# Patient Record
Sex: FEMALE | Race: WHITE | Employment: FULL TIME | URBAN - METROPOLITAN AREA
[De-identification: names, ages, dates, MRNs, and addresses within clinical notes are randomized per-mention and may not be internally consistent; named-entity substitution may affect disease eponyms.]

---

## 2021-10-09 ENCOUNTER — HOSPITAL ENCOUNTER (INPATIENT)
Age: 21
LOS: 3 days | Discharge: HOME | DRG: 885 | End: 2021-10-14
Attending: EMERGENCY MEDICINE | Admitting: PSYCHIATRY & NEUROLOGY
Payer: COMMERCIAL

## 2021-10-09 DIAGNOSIS — F39 MOOD DISORDER (HCC): Primary | ICD-10-CM

## 2021-10-09 PROCEDURE — 80143 DRUG ASSAY ACETAMINOPHEN: CPT

## 2021-10-09 PROCEDURE — 83735 ASSAY OF MAGNESIUM: CPT

## 2021-10-09 PROCEDURE — 80179 DRUG ASSAY SALICYLATE: CPT

## 2021-10-09 PROCEDURE — 85025 COMPLETE CBC W/AUTO DIFF WBC: CPT

## 2021-10-09 PROCEDURE — 80076 HEPATIC FUNCTION PANEL: CPT

## 2021-10-09 PROCEDURE — 87636 SARSCOV2 & INF A&B AMP PRB: CPT

## 2021-10-09 PROCEDURE — 80307 DRUG TEST PRSMV CHEM ANLYZR: CPT

## 2021-10-09 PROCEDURE — 80048 BASIC METABOLIC PNL TOTAL CA: CPT

## 2021-10-09 PROCEDURE — 99285 EMERGENCY DEPT VISIT HI MDM: CPT

## 2021-10-09 PROCEDURE — 82077 ASSAY SPEC XCP UR&BREATH IA: CPT

## 2021-10-09 PROCEDURE — 93005 ELECTROCARDIOGRAM TRACING: CPT | Performed by: STUDENT IN AN ORGANIZED HEALTH CARE EDUCATION/TRAINING PROGRAM

## 2021-10-09 PROCEDURE — 36415 COLL VENOUS BLD VENIPUNCTURE: CPT

## 2021-10-09 RX ORDER — ACETAMINOPHEN 325 MG/1
650 TABLET ORAL ONCE
Status: COMPLETED | OUTPATIENT
Start: 2021-10-10 | End: 2021-10-10

## 2021-10-09 ASSESSMENT — ENCOUNTER SYMPTOMS
EYE PAIN: 0
COUGH: 0
SORE THROAT: 0
NAUSEA: 0
EYE DISCHARGE: 0
ABDOMINAL PAIN: 0
BACK PAIN: 0
DIARRHEA: 0
EYE REDNESS: 0
SINUS PRESSURE: 0
VOMITING: 0
WHEEZING: 0

## 2021-10-10 LAB
ACETAMINOPHEN LEVEL: <5 MCG/ML (ref 10–30)
ALBUMIN SERPL-MCNC: 4.2 G/DL (ref 3.5–5.2)
ALP BLD-CCNC: 72 U/L (ref 35–104)
ALT SERPL-CCNC: 15 U/L (ref 0–32)
AMPHETAMINE SCREEN, URINE: NOT DETECTED
ANION GAP SERPL CALCULATED.3IONS-SCNC: 15 MMOL/L (ref 7–16)
AST SERPL-CCNC: 20 U/L (ref 0–31)
BARBITURATE SCREEN URINE: NOT DETECTED
BASOPHILS ABSOLUTE: 0.02 E9/L (ref 0–0.2)
BASOPHILS RELATIVE PERCENT: 0.2 % (ref 0–2)
BENZODIAZEPINE SCREEN, URINE: NOT DETECTED
BILIRUB SERPL-MCNC: <0.2 MG/DL (ref 0–1.2)
BILIRUBIN DIRECT: <0.2 MG/DL (ref 0–0.3)
BILIRUBIN, INDIRECT: NORMAL MG/DL (ref 0–1)
BUN BLDV-MCNC: 8 MG/DL (ref 6–20)
CALCIUM SERPL-MCNC: 9.8 MG/DL (ref 8.6–10.2)
CANNABINOID SCREEN URINE: NOT DETECTED
CHLORIDE BLD-SCNC: 102 MMOL/L (ref 98–107)
CO2: 23 MMOL/L (ref 22–29)
COCAINE METABOLITE SCREEN URINE: NOT DETECTED
CREAT SERPL-MCNC: 0.6 MG/DL (ref 0.5–1)
EOSINOPHILS ABSOLUTE: 0.02 E9/L (ref 0.05–0.5)
EOSINOPHILS RELATIVE PERCENT: 0.2 % (ref 0–6)
ETHANOL: 35 MG/DL (ref 0–0.08)
FENTANYL SCREEN, URINE: NOT DETECTED
GFR AFRICAN AMERICAN: >60
GFR NON-AFRICAN AMERICAN: >60 ML/MIN/1.73
GLUCOSE BLD-MCNC: 89 MG/DL (ref 74–99)
HCG, URINE, POC: NEGATIVE
HCT VFR BLD CALC: 38.5 % (ref 34–48)
HEMOGLOBIN: 13 G/DL (ref 11.5–15.5)
IMMATURE GRANULOCYTES #: 0.03 E9/L
IMMATURE GRANULOCYTES %: 0.4 % (ref 0–5)
INFLUENZA A: NOT DETECTED
INFLUENZA B: NOT DETECTED
LYMPHOCYTES ABSOLUTE: 2.29 E9/L (ref 1.5–4)
LYMPHOCYTES RELATIVE PERCENT: 26.9 % (ref 20–42)
Lab: NORMAL
Lab: NORMAL
MAGNESIUM: 2.2 MG/DL (ref 1.6–2.6)
MCH RBC QN AUTO: 30.7 PG (ref 26–35)
MCHC RBC AUTO-ENTMCNC: 33.8 % (ref 32–34.5)
MCV RBC AUTO: 91 FL (ref 80–99.9)
METHADONE SCREEN, URINE: NOT DETECTED
MONOCYTES ABSOLUTE: 0.36 E9/L (ref 0.1–0.95)
MONOCYTES RELATIVE PERCENT: 4.2 % (ref 2–12)
NEGATIVE QC PASS/FAIL: NORMAL
NEUTROPHILS ABSOLUTE: 5.78 E9/L (ref 1.8–7.3)
NEUTROPHILS RELATIVE PERCENT: 68.1 % (ref 43–80)
OPIATE SCREEN URINE: NOT DETECTED
OXYCODONE URINE: NOT DETECTED
PDW BLD-RTO: 12 FL (ref 11.5–15)
PHENCYCLIDINE SCREEN URINE: NOT DETECTED
PLATELET # BLD: 300 E9/L (ref 130–450)
PMV BLD AUTO: 11.2 FL (ref 7–12)
POSITIVE QC PASS/FAIL: NORMAL
POTASSIUM REFLEX MAGNESIUM: 3.6 MMOL/L (ref 3.5–5)
RBC # BLD: 4.23 E12/L (ref 3.5–5.5)
SALICYLATE, SERUM: <0.3 MG/DL (ref 0–30)
SARS-COV-2 RNA, RT PCR: NOT DETECTED
SODIUM BLD-SCNC: 140 MMOL/L (ref 132–146)
TOTAL PROTEIN: 7.5 G/DL (ref 6.4–8.3)
TRICYCLIC ANTIDEPRESSANTS SCREEN SERUM: NEGATIVE NG/ML
WBC # BLD: 8.5 E9/L (ref 4.5–11.5)

## 2021-10-10 PROCEDURE — 80307 DRUG TEST PRSMV CHEM ANLYZR: CPT

## 2021-10-10 PROCEDURE — 93005 ELECTROCARDIOGRAM TRACING: CPT | Performed by: STUDENT IN AN ORGANIZED HEALTH CARE EDUCATION/TRAINING PROGRAM

## 2021-10-10 PROCEDURE — 6370000000 HC RX 637 (ALT 250 FOR IP): Performed by: STUDENT IN AN ORGANIZED HEALTH CARE EDUCATION/TRAINING PROGRAM

## 2021-10-10 PROCEDURE — 6370000000 HC RX 637 (ALT 250 FOR IP): Performed by: EMERGENCY MEDICINE

## 2021-10-10 RX ORDER — LEVOTHYROXINE SODIUM 0.05 MG/1
50 TABLET ORAL DAILY
COMMUNITY
Start: 2021-06-01

## 2021-10-10 RX ADMIN — METFORMIN HYDROCHLORIDE 500 MG: 500 TABLET ORAL at 21:14

## 2021-10-10 RX ADMIN — ACETAMINOPHEN 650 MG: 325 TABLET ORAL at 01:19

## 2021-10-10 ASSESSMENT — PAIN DESCRIPTION - PROGRESSION: CLINICAL_PROGRESSION: NOT CHANGED

## 2021-10-10 ASSESSMENT — PAIN SCALES - GENERAL: PAINLEVEL_OUTOF10: 7

## 2021-10-10 ASSESSMENT — PAIN DESCRIPTION - DESCRIPTORS: DESCRIPTORS: HEADACHE

## 2021-10-10 ASSESSMENT — PAIN DESCRIPTION - FREQUENCY: FREQUENCY: CONTINUOUS

## 2021-10-10 ASSESSMENT — PAIN DESCRIPTION - ONSET: ONSET: ON-GOING

## 2021-10-10 ASSESSMENT — PAIN DESCRIPTION - PAIN TYPE: TYPE: ACUTE PAIN

## 2021-10-10 ASSESSMENT — PAIN DESCRIPTION - LOCATION: LOCATION: HEAD
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
## 2021-10-10 NOTE — ED NOTES
Emergency Department [facility] AN AFFILIATE OF [facility] Emergency Room Biopsychosocial Assessment Note    Chief Complaint: The pt is a 22 yr old white female who presented to the ED after OD on 10 Zoloft (unknown exact amount) - she stated that she was drunk but her BAL was 35. MSE: The pt presented calm and cooperative with a flat affect and depressed mood. She is oriented times 4 and is a good historian. She denied a hx of AVH. Clinical Summary/History:  The pt stated that she has been having multiple stressors in that she is struggling with school and often feels that she is letting her family down. She stated that she goes to [employer] and works at [employer] and lives on campus in an apartment with 2 friends and her sister. She stated that she does not like letting people down and she feels like when she skips class or blows off her homework then she is letting her sister down. She stated that she has been self harming since she was 9 and did have an OD attempt on Oxy when she was 16. She stated that from age [age] she was using etoh and drugs and then her mom got her into counseling and she no longer has issues with that. She stated that its been a while since she has been to counseling but she does have an upcoming appointment with a counselor in [place]- where she is from. She stated that she gets her zoloft from her PCP. The pt denied a hx of HI, AHV, and inpt psych admits. The pt seemed to have difficulty pin pointing what her exact issues are and what caused her to decide to OD. Pt is pink slipped and will be referred for inpt psych admission. Gender  [] Male [x] Female [] Transgender  [] Other    Sexual Orientation    [x] Heterosexual [] Homosexual [] Bisexual [] Other    Suicidal Behavioral: CSSR-S Complete.   [x] Reports: took 10 zoloft/ Hx of attempts   [x] Past [x] Present   [] Denies    Homicidal/ Violent Behavior  [] Reports:   [] Past [] Present   [x] Denies     Hallucinations/Delusions   [] Reports:   [x] Denies     Substance Use/Alcohol Use/Addiction: SBIRT Screen Complete.    [x] Reports: Social alcohol   [] Denies     Trauma History  [x] Reports:  [x] Denies     Collateral Information: none      Level of Care/Disposition Plan  [] Home:   [] Outpatient Provider:   [] Crisis Unit:   [x] Inpatient Psychiatric Unit:  [] Other:        A[facility]an [facility]  10/10/21 8995

## 2021-10-10 NOTE — ED NOTES
Called and notified lab of add-on magnesium order, spoke to Lauren Lima.       Kike Leon RN  10/10/21 3405

## 2021-10-10 NOTE — ED NOTES
Received report from [facility] who states EKG and labs already completed and patient is on monitor.       Casey Quan RN  10/09/21 5872

## 2021-10-10 NOTE — ED NOTES
Patient remains A&OX4, respirations non-labored, skin warm/dry, no distress noted. Patient voicing no complaints at present, states she had a little bit of nausea earlier but it is gone now. Patient drinking water, tolerating well. Patient states she feels fine and wants to go, asked to sign herself out. Informed patient of pink slip and need to wait in ED until evaluated by social work.       Annie Serrano RN  10/10/21 3353

## 2021-10-10 NOTE — ED NOTES
Okay to now discontinue hourly EKG's per Dr. Leann Reynolds and Dr. Liliana Miller.       Annie Serrano RN  10/10/21 6498

## 2021-10-10 NOTE — ED PROVIDER NOTES
Patient is a 76-year-old female presenting to emergency department after drug overdose. Took over 10 tablets of 50 mg Zoloft, says she drank a lot of alcohol denies using anything else. Says she took it in an attempt to harm herself, has had previous suicide attempt with pills in the past.  Denies any homicidal ideation, any auditory or visual hallucinations. Onset of symptoms were 40 minutes prior to arrival, has been persistent, nothing makes it better, nothing makes them worse, constant, severe in severity. She complains of everything feeling tingly to me. And feeling anxious. Review of Systems   Constitutional: Negative for chills and fever. HENT: Negative for ear pain, sinus pressure and sore throat. Eyes: Negative for pain, discharge and redness. Respiratory: Negative for cough and wheezing. Cardiovascular: Negative for chest pain. Gastrointestinal: Negative for abdominal pain, diarrhea, nausea and vomiting. Genitourinary: Negative for dysuria and frequency. Musculoskeletal: Negative for arthralgias and back pain. Skin: Negative for rash and wound. Neurological: Positive for headaches. Negative for weakness. Hematological: Negative for adenopathy. Psychiatric/Behavioral: Positive for suicidal ideas. All other systems reviewed and are negative. Physical Exam  Vitals and nursing note reviewed. Constitutional:       Appearance: Normal appearance. She is not ill-appearing or toxic-appearing. HENT:      Head: Normocephalic and atraumatic. Right Ear: External ear normal.      Left Ear: External ear normal.      Nose: Nose normal.      Mouth/Throat:      Mouth: Mucous membranes are moist.   Eyes:      Extraocular Movements: Extraocular movements intact. Conjunctiva/sclera: Conjunctivae normal.      Pupils: Pupils are equal, round, and reactive to light.       Comments: Pupils dilated, but equal and reactive   Cardiovascular:      Rate and Rhythm: Regular rhythm. Tachycardia present. Pulses: Normal pulses. Heart sounds: Normal heart sounds. Pulmonary:      Effort: Pulmonary effort is normal. No respiratory distress. Breath sounds: Normal breath sounds. No stridor. No wheezing or rhonchi. Abdominal:      General: Abdomen is flat. Bowel sounds are normal.      Palpations: Abdomen is soft. Musculoskeletal:         General: Normal range of motion. Cervical back: Normal range of motion and neck supple. Skin:     General: Skin is warm and dry. Neurological:      General: No focal deficit present. Mental Status: She is alert and oriented to person, place, and time. Cranial Nerves: No cranial nerve deficit. Sensory: No sensory deficit. Motor: No weakness. Procedures     MDM     ED Course as of Oct 10 0532   Radha Osegueraippo Oct 10, 2021   0006 EKG: This EKG is signed by emergency department physician. Rate: 122  Rhythm: Sinus  Interpretation: incomplete RBBB  Comparison: no previous EKG available qtc 604        [JG]   0007 Poison control- 4.5-8hr peak, watch for qt prolong on serial EKGs, seizures, serotonin syndrome, seizures    [JG]   0037 EKG: This EKG is signed by emergency department physician. Rate: 101  Rhythm: Sinus  Interpretation: Left atrial management, incomplete right bundle  Comparison: changes compared to previous EKG , decreased compared to previous        [JG]   0108 Laboratory work shows an ethanol level of 35, but is otherwise within normal limits, QTC normalized, will repeat an EKG, continue to monitor in order to medically clear patient. [JG]   7585 Third EKG QTc normal, patient medically cleared    [JG]    Patient medically cleared, awaiting behavioral health disposition.     [JG]      ED Course User Index  [JG] Natalie Castillo MD   Patient presenting emergency department for drug overdose, took 10 tablets of 50 mg Zoloft, initially had a QTC of 604, was serially rechecked, was normalized x2, other laboratory work within normal limits, patient was medically cleared. ED Course as of Oct 10 0532   Hilary Herr Oct 10, 2021   0006 EKG: This EKG is signed by emergency department physician. Rate: 122  Rhythm: Sinus  Interpretation: incomplete RBBB  Comparison: no previous EKG available qtc 604        [JG]   0007 Poison control- 4.5-8hr peak, watch for qt prolong on serial EKGs, seizures, serotonin syndrome, seizures    [JG]   0037 EKG: This EKG is signed by emergency department physician. Rate: 101  Rhythm: Sinus  Interpretation: Left atrial management, incomplete right bundle  Comparison: changes compared to previous EKG , decreased compared to previous        [JG]   0108 Laboratory work shows an ethanol level of 35, but is otherwise within normal limits, QTC normalized, will repeat an EKG, continue to monitor in order to medically clear patient. [JG]   6110 Third EKG QTc normal, patient medically cleared    [JG]    Patient medically cleared, awaiting behavioral health disposition. [JG]      ED Course User Index  [JG] Jose Garay MD       --------------------------------------------- PAST HISTORY ---------------------------------------------  Past Medical History:  has no past medical history on file. Past Surgical History:  has no past surgical history on file. Social History:  reports that she has been smoking e-cigarettes. She has never used smokeless tobacco.    Family History: family history is not on file. The patients home medications have been reviewed. Allergies: Patient has no known allergies.     -------------------------------------------------- RESULTS -------------------------------------------------    LABS:  Results for orders placed or performed during the hospital encounter of 10/09/21   COVID-19 & Influenza Combo    Specimen: Nasopharyngeal Swab   Result Value Ref Range    SARS-CoV-2 RNA, RT PCR NOT DETECTED NOT DETECTED    INFLUENZA A NOT DETECTED NOT DETECTED    INFLUENZA B NOT DETECTED NOT DETECTED   CBC Auto Differential   Result Value Ref Range    WBC 8.5 4.5 - 11.5 E9/L    RBC 4.23 3.50 - 5.50 E12/L    Hemoglobin 13.0 11.5 - 15.5 g/dL    Hematocrit 38.5 34.0 - 48.0 %    MCV 91.0 80.0 - 99.9 fL    MCH 30.7 26.0 - 35.0 pg    MCHC 33.8 32.0 - 34.5 %    RDW 12.0 11.5 - 15.0 fL    Platelets 700 439 - 928 E9/L    MPV 11.2 7.0 - 12.0 fL    Neutrophils % 68.1 43.0 - 80.0 %    Immature Granulocytes % 0.4 0.0 - 5.0 %    Lymphocytes % 26.9 20.0 - 42.0 %    Monocytes % 4.2 2.0 - 12.0 %    Eosinophils % 0.2 0.0 - 6.0 %    Basophils % 0.2 0.0 - 2.0 %    Neutrophils Absolute 5.78 1.80 - 7.30 E9/L    Immature Granulocytes # 0.03 E9/L    Lymphocytes Absolute 2.29 1.50 - 4.00 E9/L    Monocytes Absolute 0.36 0.10 - 0.95 E9/L    Eosinophils Absolute 0.02 (L) 0.05 - 0.50 E9/L    Basophils Absolute 0.02 0.00 - 0.20 C4/A   Basic Metabolic Panel w/ Reflex to MG   Result Value Ref Range    Sodium 140 132 - 146 mmol/L    Potassium reflex Magnesium 3.6 3.5 - 5.0 mmol/L    Chloride 102 98 - 107 mmol/L    CO2 23 22 - 29 mmol/L    Anion Gap 15 7 - 16 mmol/L    Glucose 89 74 - 99 mg/dL    BUN 8 6 - 20 mg/dL    CREATININE 0.6 0.5 - 1.0 mg/dL    GFR Non-African American >60 >=60 mL/min/1.73    GFR African American >60     Calcium 9.8 8.6 - 10.2 mg/dL   Hepatic Function Panel   Result Value Ref Range    Total Protein 7.5 6.4 - 8.3 g/dL    Albumin 4.2 3.5 - 5.2 g/dL    Alkaline Phosphatase 72 35 - 104 U/L    ALT 15 0 - 32 U/L    AST 20 0 - 31 U/L    Total Bilirubin <0.2 0.0 - 1.2 mg/dL    Bilirubin, Direct <0.2 0.0 - 0.3 mg/dL    Bilirubin, Indirect see below 0.0 - 1.0 mg/dL   URINE DRUG SCREEN   Result Value Ref Range    Amphetamine Screen, Urine NOT DETECTED Negative <1000 ng/mL    Barbiturate Screen, Ur NOT DETECTED Negative < 200 ng/mL    Benzodiazepine Screen, Urine NOT DETECTED Negative < 200 ng/mL    Cannabinoid Scrn, Ur NOT DETECTED Negative < 50ng/mL    Cocaine Metabolite Screen, Urine NOT DETECTED Negative < 300 ng/mL    Opiate Scrn, Ur NOT DETECTED Negative < 300ng/mL    PCP Screen, Urine NOT DETECTED Negative < 25 ng/mL    Methadone Screen, Urine NOT DETECTED Negative <300 ng/mL    Oxycodone Urine NOT DETECTED Negative <100 ng/mL    FENTANYL SCREEN, URINE NOT DETECTED Negative <1 ng/mL    Drug Screen Comment: see below    Serum Drug Screen   Result Value Ref Range    Ethanol Lvl 35 mg/dL    Acetaminophen Level <5.0 (L) 10.0 - 76.0 mcg/mL    Salicylate, Serum <4.6 0.0 - 30.0 mg/dL    TCA Scrn NEGATIVE Cutoff:300 ng/mL   Magnesium   Result Value Ref Range    Magnesium 2.2 1.6 - 2.6 mg/dL   POC Pregnancy Urine   Result Value Ref Range    HCG, Urine, POC Negative Negative    Lot Number [id number]     Positive QC Pass/Fail Pass     Negative QC Pass/Fail Pass        RADIOLOGY:  No orders to display         ------------------------- NURSING NOTES AND VITALS REVIEWED ---------------------------  Date / Time Roomed:  10/9/2021 11:23 PM  ED Bed Assignment:  COBY MAGAÑA/KARENA    The nursing notes within the ED encounter and vital signs as below have been reviewed. Patient Vitals for the past 24 hrs:   Temp Temp src Pulse Resp SpO2 Height Weight   10/09/21 2318 98 °F (36.7 °C) Oral  16      10/09/21 2316      5' 6\" (1.676 m) 250 lb (113.4 kg)   10/09/21 2303   132  98 %         Oxygen Saturation Interpretation: Normal    ------------------------------------------ PROGRESS NOTES ------------------------------------------        --------------------------------- ADDITIONAL PROVIDER NOTES ---------------------------------    This patient has remained hemodynamically stable during their ED course. Diagnosis:  1. Mood disorder ([address]        Disposition:  Patient's disposition: Pending social work disposition  Patient's condition is stable.              Vicente Foley MD  Resident  10/10/21 7874

## 2021-10-10 NOTE — ED NOTES
Patient in hospital gown without ties, hospital pants, and hospital socks. All patient belongings in one labeled belongings bags and bag placed in [facility] AN AFFILIATE OF [facility] locker #[id number] per protocol.       Veda Nunn RN  10/10/21 2879

## 2021-10-10 NOTE — ED NOTES
CO request faxed to nursing staffing office per protocol. Charge nurse Essie Benavides RN notified that request was faxed.        Heath Lawrence RN  10/10/21 7265

## 2021-10-11 PROBLEM — F32.A DEPRESSION WITH SUICIDAL IDEATION: Status: ACTIVE | Noted: 2021-10-11

## 2021-10-11 PROBLEM — R45.851 DEPRESSION WITH SUICIDAL IDEATION: Status: ACTIVE | Noted: 2021-10-11

## 2021-10-11 LAB
EKG ATRIAL RATE: 101 BPM
EKG ATRIAL RATE: 104 BPM
EKG ATRIAL RATE: 122 BPM
EKG P AXIS: 24 DEGREES
EKG P AXIS: 25 DEGREES
EKG P AXIS: 29 DEGREES
EKG P-R INTERVAL: 136 MS
EKG P-R INTERVAL: 138 MS
EKG P-R INTERVAL: 156 MS
EKG Q-T INTERVAL: 348 MS
EKG Q-T INTERVAL: 350 MS
EKG Q-T INTERVAL: 424 MS
EKG QRS DURATION: 96 MS
EKG QTC CALCULATION (BAZETT): 451 MS
EKG QTC CALCULATION (BAZETT): 460 MS
EKG QTC CALCULATION (BAZETT): 604 MS
EKG R AXIS: 0 DEGREES
EKG R AXIS: 2 DEGREES
EKG R AXIS: 3 DEGREES
EKG T AXIS: 19 DEGREES
EKG T AXIS: 27 DEGREES
EKG T AXIS: 36 DEGREES
EKG VENTRICULAR RATE: 101 BPM
EKG VENTRICULAR RATE: 104 BPM
EKG VENTRICULAR RATE: 122 BPM

## 2021-10-11 PROCEDURE — 6370000000 HC RX 637 (ALT 250 FOR IP): Performed by: PSYCHIATRY & NEUROLOGY

## 2021-10-11 PROCEDURE — 6370000000 HC RX 637 (ALT 250 FOR IP)

## 2021-10-11 PROCEDURE — 93010 ELECTROCARDIOGRAM REPORT: CPT | Performed by: INTERNAL MEDICINE

## 2021-10-11 PROCEDURE — 1240000000 HC EMOTIONAL WELLNESS R&B

## 2021-10-11 RX ORDER — TRAZODONE HYDROCHLORIDE 50 MG/1
50 TABLET ORAL NIGHTLY PRN
Status: DISCONTINUED | OUTPATIENT
Start: 2021-10-11 | End: 2021-10-14 | Stop reason: HOSPADM

## 2021-10-11 RX ORDER — ACETAMINOPHEN 325 MG/1
650 TABLET ORAL ONCE
Status: COMPLETED | OUTPATIENT
Start: 2021-10-11 | End: 2021-10-11

## 2021-10-11 RX ORDER — HYDROXYZINE PAMOATE 50 MG/1
50 CAPSULE ORAL 3 TIMES DAILY PRN
Status: DISCONTINUED | OUTPATIENT
Start: 2021-10-11 | End: 2021-10-14 | Stop reason: HOSPADM

## 2021-10-11 RX ORDER — ACETAMINOPHEN 325 MG/1
650 TABLET ORAL EVERY 6 HOURS PRN
Status: DISCONTINUED | OUTPATIENT
Start: 2021-10-11 | End: 2021-10-14 | Stop reason: HOSPADM

## 2021-10-11 RX ORDER — NICOTINE 21 MG/24HR
1 PATCH, TRANSDERMAL 24 HOURS TRANSDERMAL DAILY
Status: DISCONTINUED | OUTPATIENT
Start: 2021-10-11 | End: 2021-10-14 | Stop reason: HOSPADM

## 2021-10-11 RX ORDER — ACETAMINOPHEN 325 MG/1
TABLET ORAL
Status: COMPLETED
Start: 2021-10-11 | End: 2021-10-11

## 2021-10-11 RX ORDER — HALOPERIDOL 5 MG/ML
5 INJECTION INTRAMUSCULAR EVERY 6 HOURS PRN
Status: DISCONTINUED | OUTPATIENT
Start: 2021-10-11 | End: 2021-10-14 | Stop reason: HOSPADM

## 2021-10-11 RX ORDER — MAGNESIUM HYDROXIDE/ALUMINUM HYDROXICE/SIMETHICONE 120; 1200; 1200 MG/30ML; MG/30ML; MG/30ML
30 SUSPENSION ORAL PRN
Status: DISCONTINUED | OUTPATIENT
Start: 2021-10-11 | End: 2021-10-14 | Stop reason: HOSPADM

## 2021-10-11 RX ORDER — HALOPERIDOL 5 MG
5 TABLET ORAL EVERY 6 HOURS PRN
Status: DISCONTINUED | OUTPATIENT
Start: 2021-10-11 | End: 2021-10-14 | Stop reason: HOSPADM

## 2021-10-11 RX ADMIN — TRAZODONE HYDROCHLORIDE 50 MG: 50 TABLET ORAL at 21:22

## 2021-10-11 RX ADMIN — ACETAMINOPHEN 650 MG: 325 TABLET ORAL at 19:00

## 2021-10-11 RX ADMIN — ACETAMINOPHEN 650 MG: 325 TABLET ORAL at 09:00

## 2021-10-11 ASSESSMENT — SLEEP AND FATIGUE QUESTIONNAIRES
DIFFICULTY ARISING: YES
SLEEP PATTERN: DIFFICULTY FALLING ASLEEP;DISTURBED/INTERRUPTED SLEEP;DIFFICULTY ARISING
DO YOU HAVE DIFFICULTY SLEEPING: YES
DO YOU USE A SLEEP AID: NO
RESTFUL SLEEP: YES
DIFFICULTY FALLING ASLEEP: YES
DIFFICULTY STAYING ASLEEP: YES
AVERAGE NUMBER OF SLEEP HOURS: 6

## 2021-10-11 ASSESSMENT — PATIENT HEALTH QUESTIONNAIRE - PHQ9: SUM OF ALL RESPONSES TO PHQ QUESTIONS 1-9: 11

## 2021-10-11 ASSESSMENT — PAIN SCALES - GENERAL
PAINLEVEL_OUTOF10: 6
PAINLEVEL_OUTOF10: 0
PAINLEVEL_OUTOF10: 8
PAINLEVEL_OUTOF10: 0

## 2021-10-11 ASSESSMENT — PAIN DESCRIPTION - DESCRIPTORS: DESCRIPTORS: HEADACHE

## 2021-10-11 ASSESSMENT — LIFESTYLE VARIABLES: HISTORY_ALCOHOL_USE: NO

## 2021-10-11 ASSESSMENT — PAIN - FUNCTIONAL ASSESSMENT: PAIN_FUNCTIONAL_ASSESSMENT: 0-10

## 2021-10-11 NOTE — BH NOTE
[facility]  Admission Note     Admission Type:   Admission Type: Involuntary    Reason for admission:  Reason for Admission: \"My friends took me here because I had an overdose. \" pr pt. PATIENT STRENGTHS:  Strengths: Communication, Employment, Social Skills, Positive Support    Patient Strengths and Limitations:  Limitations: External locus of control    Addictive Behavior:   Addictive Behavior  In the past 3 months, have you felt or has someone told you that you have a problem with:  : None  Do you have a history of Chemical Use?: No  Do you have a history of Alcohol Use?: No  Do you have a history of Street Drug Abuse?: No  Histroy of Prescripton Drug Abuse?: No    Medical Problems:   Past Medical History:   Diagnosis Date    Depression     Polycystic ovarian disease     Thyroid disease        Status EXAM:  Status and Exam  Normal: Yes  Facial Expression: Brightened  Affect: Congruent  Level of Consciousness: Alert  Mood:Normal: Yes  Motor Activity:Normal: Yes  Interview Behavior: Cooperative  Preception: [place] to Person, Sarah Shank to Time, Harrison to Place, Harrison to Situation  Attention:Normal: Yes  Thought Processes: Circumstantial  Thought Content:Normal: Yes  Hallucinations: None  Delusions: No  Memory:Normal: Yes  Insight and Judgment: No  Insight and Judgment: Poor Insight  Present Suicidal Ideation: No  Present Homicidal Ideation: No    Tobacco Screening:  Practical Counseling, on admission, jennifer X, if applicable and completed (first 3 are required if patient doesn't refuse):             ( x)  Recognizing danger situations (included triggers and roadblocks)                    ( x)  Coping skills (new ways to manage stress, exercise, relaxation techniques, changing routine, distraction)                                                           (x )  Basic information about quitting (benefits of quitting, techniques in how to quit, available resources  ( ) Referral for counseling faxed to

## 2021-10-12 PROBLEM — F60.89 CLUSTER B PERSONALITY DISORDER (HCC): Status: ACTIVE | Noted: 2021-10-12

## 2021-10-12 PROBLEM — F33.9 MAJOR DEPRESSIVE DISORDER, RECURRENT EPISODE WITH MIXED FEATURES (HCC): Status: ACTIVE | Noted: 2021-10-12

## 2021-10-12 PROBLEM — F10.10 ALCOHOL ABUSE: Status: ACTIVE | Noted: 2021-10-12

## 2021-10-12 LAB
CHOLESTEROL, TOTAL: 163 MG/DL (ref 0–199)
EKG ATRIAL RATE: 74 BPM
EKG P AXIS: 27 DEGREES
EKG P-R INTERVAL: 148 MS
EKG Q-T INTERVAL: 370 MS
EKG QRS DURATION: 96 MS
EKG QTC CALCULATION (BAZETT): 410 MS
EKG R AXIS: 24 DEGREES
EKG T AXIS: 13 DEGREES
EKG VENTRICULAR RATE: 74 BPM
HBA1C MFR BLD: 5.1 % (ref 4–5.6)
HDLC SERPL-MCNC: 47 MG/DL
LDL CHOLESTEROL CALCULATED: 103 MG/DL (ref 0–99)
TRIGL SERPL-MCNC: 65 MG/DL (ref 0–149)
VLDLC SERPL CALC-MCNC: 13 MG/DL

## 2021-10-12 PROCEDURE — 6370000000 HC RX 637 (ALT 250 FOR IP): Performed by: NURSE PRACTITIONER

## 2021-10-12 PROCEDURE — 80061 LIPID PANEL: CPT

## 2021-10-12 PROCEDURE — 93005 ELECTROCARDIOGRAM TRACING: CPT | Performed by: NURSE PRACTITIONER

## 2021-10-12 PROCEDURE — 93010 ELECTROCARDIOGRAM REPORT: CPT | Performed by: INTERNAL MEDICINE

## 2021-10-12 PROCEDURE — 99221 1ST HOSP IP/OBS SF/LOW 40: CPT | Performed by: NURSE PRACTITIONER

## 2021-10-12 PROCEDURE — 1240000000 HC EMOTIONAL WELLNESS R&B

## 2021-10-12 PROCEDURE — 83036 HEMOGLOBIN GLYCOSYLATED A1C: CPT

## 2021-10-12 PROCEDURE — 6370000000 HC RX 637 (ALT 250 FOR IP): Performed by: PSYCHIATRY & NEUROLOGY

## 2021-10-12 PROCEDURE — 36415 COLL VENOUS BLD VENIPUNCTURE: CPT

## 2021-10-12 RX ORDER — CITALOPRAM 20 MG/1
10 TABLET ORAL DAILY
Status: CANCELLED | OUTPATIENT
Start: 2021-10-12

## 2021-10-12 RX ORDER — CITALOPRAM 20 MG/1
10 TABLET ORAL DAILY
Status: DISCONTINUED | OUTPATIENT
Start: 2021-10-12 | End: 2021-10-14 | Stop reason: HOSPADM

## 2021-10-12 RX ORDER — OXCARBAZEPINE 300 MG/1
150 TABLET, FILM COATED ORAL 2 TIMES DAILY
Status: DISCONTINUED | OUTPATIENT
Start: 2021-10-12 | End: 2021-10-13

## 2021-10-12 RX ORDER — LEVOTHYROXINE SODIUM 0.05 MG/1
50 TABLET ORAL DAILY
Status: DISCONTINUED | OUTPATIENT
Start: 2021-10-12 | End: 2021-10-14 | Stop reason: HOSPADM

## 2021-10-12 RX ADMIN — METFORMIN HYDROCHLORIDE 500 MG: 500 TABLET ORAL at 13:28

## 2021-10-12 RX ADMIN — OXCARBAZEPINE 150 MG: 300 TABLET, FILM COATED ORAL at 14:17

## 2021-10-12 RX ADMIN — OXCARBAZEPINE 150 MG: 300 TABLET, FILM COATED ORAL at 21:12

## 2021-10-12 RX ADMIN — CITALOPRAM HYDROBROMIDE 10 MG: 20 TABLET ORAL at 15:30

## 2021-10-12 RX ADMIN — TRAZODONE HYDROCHLORIDE 50 MG: 50 TABLET ORAL at 22:21

## 2021-10-12 ASSESSMENT — SLEEP AND FATIGUE QUESTIONNAIRES
DO YOU HAVE DIFFICULTY SLEEPING: YES
RESTFUL SLEEP: NO
AVERAGE NUMBER OF SLEEP HOURS: 7
DIFFICULTY FALLING ASLEEP: YES
DIFFICULTY ARISING: YES
SLEEP PATTERN: DIFFICULTY FALLING ASLEEP;DIFFICULTY ARISING;NIGHTMARES/TERRORS;DISTURBED/INTERRUPTED SLEEP
DO YOU USE A SLEEP AID: YES

## 2021-10-12 ASSESSMENT — PAIN SCALES - GENERAL: PAINLEVEL_OUTOF10: 0

## 2021-10-12 ASSESSMENT — PATIENT HEALTH QUESTIONNAIRE - PHQ9: SUM OF ALL RESPONSES TO PHQ QUESTIONS 1-9: 4

## 2021-10-12 ASSESSMENT — LIFESTYLE VARIABLES: HISTORY_ALCOHOL_USE: YES
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
## 2021-10-12 NOTE — CARE COORDINATION
JOSEPH spoke with patient's sister Waleska Chahal [phone]. She states PTA patient \"got drunk, got upset, went into the bathroom when we weren't looking, and took her depression meds. About 30 mn later she came and told us and we brought her in\". She states patient is able to return home and she will be able to provide transport for discharge. She states there are no weapons in the home and she has no safety concerns with the patient returning home.

## 2021-10-12 NOTE — PROGRESS NOTES
Patient A&Ox4. Pt denies SI, HI, hallucinations, anxiety and depression. Pt is med. & meal compliant. Patient is pleasant, calm and cooperative with a bright affect. Pt with even and unlabored breathing, no signs of acute physical distress noted. Will continue to monitor.

## 2021-10-12 NOTE — PLAN OF CARE
[facility]  Day 3 Interdisciplinary Treatment Plan NOTE    Review Date & Time: 10/12/2021 1000    Patient was in treatment team    Estimated Length of Stay Update:  3-5  Estimated Discharge Date Update: 10/15/2021    EDUCATION:   Learner Progress Toward Treatment Goals: Reviewed results and recommendations of this team    Method: Small group    Outcome: Verbalized understanding    PATIENT GOALS: \"get a shower, put myself together. \"    PLAN/TREATMENT RECOMMENDATIONS UPDATE: Encourage pt to attend group and continue to support as needed.     GOALS UPDATE:   Time frame for Short-Term Goals: 1-3      Arnaud Livingston RN

## 2021-10-12 NOTE — BH NOTE
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

## 2021-10-12 NOTE — GROUP NOTE
Group Therapy Note    Date: 10/12/2021    Group Start Time: 4086  Group End Time: 4438  Group Topic: Psychoeducation    [facility] ACUTE [address]        Group Therapy Note    Attendees: 15         Patient's Goal:  Get a shower, put myself together. Notes: Active and engaged during discussion on gratitude. Able to share experiences and relate to peers. Status After Intervention:  Improved    Participation Level:  Active Listener and Interactive    Participation Quality: Appropriate, Attentive, Sharing and Supportive      Speech:  normal      Thought Process/Content: Logical      Affective Functioning: Congruent      Mood: euthymic      Level of consciousness:  Alert and Attentive      Response to Learning: Progressing to goal      Endings: None Reported    Modes of Intervention: Education, Support and Socialization      Discipline Responsible: Psychoeducational Specialist      Signature:  Jeronimo Meyer, [address]

## 2021-10-12 NOTE — GROUP NOTE
Group Therapy Note    Date: 10/12/2021    Group Start Time: 1100  Group End Time: 1130  Group Topic: Cognitive Skills    [facility] AC[facility] BH 1    RICH Mccabe LSW        Group Therapy Note    Attendees: 13           Patient's Goal:  Pt will be able to verbalize and understand stress management    Notes:  Pt participated in group and made connections    Status After Intervention:  Improved    Participation Level:  Active Listener and Interactive    Participation Quality: Appropriate, Attentive and Sharing      Speech:  normal      Thought Process/Content: Logical  Linear      Affective Functioning: Congruent      Mood: anxious and depressed      Level of consciousness:  Alert, Oriented x4 and Attentive      Response to Learning: Able to verbalize current knowledge/experience      Endings: None Reported    Modes of Intervention: Education, Support, Socialization, Exploration, Clarifying and Problem-solving      Discipline Responsible: /Counselor      Signature:  RICH Mccabe LSW

## 2021-10-13 PROCEDURE — 99232 SBSQ HOSP IP/OBS MODERATE 35: CPT | Performed by: NURSE PRACTITIONER

## 2021-10-13 PROCEDURE — 1240000000 HC EMOTIONAL WELLNESS R&B

## 2021-10-13 PROCEDURE — 6370000000 HC RX 637 (ALT 250 FOR IP): Performed by: PSYCHIATRY & NEUROLOGY

## 2021-10-13 PROCEDURE — 6370000000 HC RX 637 (ALT 250 FOR IP): Performed by: NURSE PRACTITIONER

## 2021-10-13 RX ORDER — OXCARBAZEPINE 300 MG/1
300 TABLET, FILM COATED ORAL 2 TIMES DAILY
Status: DISCONTINUED | OUTPATIENT
Start: 2021-10-13 | End: 2021-10-14 | Stop reason: HOSPADM

## 2021-10-13 RX ADMIN — LEVOTHYROXINE SODIUM 50 MCG: 0.05 TABLET ORAL at 06:45

## 2021-10-13 RX ADMIN — OXCARBAZEPINE 300 MG: 300 TABLET, FILM COATED ORAL at 21:08

## 2021-10-13 RX ADMIN — METFORMIN HYDROCHLORIDE 500 MG: 500 TABLET ORAL at 09:06

## 2021-10-13 RX ADMIN — TRAZODONE HYDROCHLORIDE 50 MG: 50 TABLET ORAL at 21:08

## 2021-10-13 RX ADMIN — CITALOPRAM HYDROBROMIDE 10 MG: 20 TABLET ORAL at 09:06

## 2021-10-13 RX ADMIN — OXCARBAZEPINE 150 MG: 300 TABLET, FILM COATED ORAL at 09:05

## 2021-10-13 ASSESSMENT — PAIN SCALES - GENERAL
PAINLEVEL_OUTOF10: 0
PAINLEVEL_OUTOF10: 0

## 2021-10-13 NOTE — PLAN OF CARE
Problem: Altered Mood, Depressive Behavior:  Goal: Able to verbalize and/or display a decrease in depressive symptoms  Description: Able to verbalize and/or display a decrease in depressive symptoms  Outcome: Ongoing  Goal: Ability to disclose and discuss suicidal ideas will improve  Description: Ability to disclose and discuss suicidal ideas will improve  Outcome: Ongoing               [place] bright and social. Poor insight into reason for admission. Denies suicidal and homicidal thoughts. Denies hallucinations. Attending group and medication compliant.

## 2021-10-13 NOTE — PLAN OF CARE
[facility]  Day 3 Interdisciplinary Treatment Plan NOTE    Review Date & Time:  10/13/2021    12:00 PM      Patient was in treatment team    Estimated Length of Stay Update:   5 days  Estimated Discharge Date Update:  10/15/21    EDUCATION:   Learner Progress Toward Treatment Goals: Reviewed results and recommendations of this team    Method: Group    Outcome: Verbalized understanding    PATIENT GOALS:  \"finish my book\"    PLAN/TREATMENT RECOMMENDATIONS UPDATE: encourage daily goals and groups    GOALS UPDATE:   Time frame for Short-Term Goals:  By discharge      Judith Christie RN

## 2021-10-13 NOTE — PROGRESS NOTES
BEHAVIORAL HEALTH FOLLOW-UP NOTE     10/13/2021     Patient was seen and examined in person, Chart reviewed   Patient's case discussed with staff/team    Chief Complaint: \" I feel fine. \"    Interim History: Patient seen during treatment team.  She denies any circumstances or hospitalization. She states she feels \"fine. \"  She shows very poor limited sign judgment to hospitalization need for treatment. She is minimizing her overdose. She states she is eating well sleeping well no neurovegetative signs or symptoms of depression. Denies auditory visualizations denies SI/HI intent or plan      Appetite:   [x] Normal/Unchanged  [] Increased  [] Decreased      Sleep:       [x] Normal/Unchanged  [] Fair       [] Poor              Energy:    [x] Normal/Unchanged  [] Increased  [] Decreased        SI [] Present  [x] Absent    HI  []Present  [x] Absent     Aggression:  [] yes  [x] no    Patient is [x] able  [] unable to CONTRACT FOR SAFETY     PAST MEDICAL/PSYCHIATRIC HISTORY:   Past Medical History:   Diagnosis Date    Depression     Polycystic ovarian disease     Thyroid disease        FAMILY/SOCIAL HISTORY:  History reviewed. No pertinent family history.   Social History     Socioeconomic History    Marital status: Single     Spouse name: Not on file    Number of children: Not on file    Years of education: Not on file    Highest education level: Not on file   Occupational History    Not on file   Tobacco Use    Smoking status: Current Every Day Smoker     Types: E-Cigarettes    Smokeless tobacco: Never Used   Vaping Use    Vaping Use: Some days    Devices: Disposable    Passive vaping exposure Yes   Substance and Sexual Activity    Alcohol use: Never    Drug use: Not Currently    Sexual activity: Not on file   Other Topics Concern    Not on file   Social History Narrative    Not on file     Social Determinants of Health     Financial Resource Strain:     Difficulty of Paying Living Expenses:    Food Insecurity:     Worried About Running Out of Food in the Last Year:     [address] in the Last Year:    Transportation Needs:     Lack of Transportation (Medical):  Lack of Transportation (Non-Medical):    Physical Activity:     Days of Exercise per Week:     Minutes of Exercise per Session:    Stress:     Feeling of Stress :    Social Connections:     Frequency of Communication with Friends and Family:     Frequency of Social Gatherings with Friends and Family:     Attends Tenriism Services:     Active Member of Clubs or Organizations:     Attends Club or Organization Meetings:     Marital Status:    Intimate Partner Violence:     Fear of Current or Ex-Partner:     Emotionally Abused:     Physically Abused:     Sexually Abused:            ROS:  [x] All negative/unchanged except if checked.  Explain positive(checked items) below:  [] Constitutional  [] Eyes  [] Ear/Nose/Mouth/Throat  [] Respiratory  [] CV  [] GI  []   [] Musculoskeletal  [] Skin/Breast  [] Neurological  [] Endocrine  [] Heme/Lymph  [] Allergic/Immunologic    Explanation:     MEDICATIONS:    Current Facility-Administered Medications:     OXcarbazepine (TRILEPTAL) tablet 150 mg, 150 mg, Oral, BID, YAMILA Ang - CNP, 789 mg at 10/12/21 2112    levothyroxine (SYNTHROID) tablet 50 mcg, 50 mcg, Oral, Daily, YAMILA Monge CNP, 50 mcg at 10/13/21 0645    metFORMIN (GLUCOPHAGE) tablet 500 mg, 500 mg, Oral, Daily, YAMILA Monge CNP, 484 mg at 10/12/21 1328    citalopram (CELEXA) tablet 10 mg, 10 mg, Oral, Daily, YAMILA Monge CNP, 10 mg at 10/12/21 1530    acetaminophen (TYLENOL) tablet 650 mg, 650 mg, Oral, Q6H PRN, Km Bain MD, 650 mg at 10/11/21 1900    magnesium hydroxide (MILK OF MAGNESIA) 400 MG/5ML suspension 30 mL, 30 mL, Oral, Daily PRN, Km Bain MD    nicotine (NICODERM CQ) 21 MG/24HR 1 patch, 1 patch, TransDERmal, Daily, Km Bain MD    aluminum & magnesium hydroxide-simethicone (MAALOX) 200-200-20 MG/5ML suspension 30 mL, 30 mL, Oral, PRN, Isael Brooks MD    hydrOXYzine (VISTARIL) capsule 50 mg, 50 mg, Oral, TID PRN, Isael Brooks MD    haloperidol (HALDOL) tablet 5 mg, 5 mg, Oral, Q6H PRN **OR** haloperidol lactate (HALDOL) injection 5 mg, 5 mg, IntraMUSCular, Q6H PRN, Isael Brooks MD    traZODone (DESYREL) tablet 50 mg, 50 mg, Oral, Nightly PRN, Isael Brooks MD, 50 mg at 10/12/21 2221      Examination:  BP (!) 97/56   Pulse 74   Temp 97.3 °F (36.3 °C) (Oral)   Resp 16   Ht 5' 6\" (1.676 m)   Wt 250 lb (113.4 kg)   LMP 09/02/2021   SpO2 98%   BMI 40.35 kg/m²   Gait - steady  Medication side effects(SE): denies    Mental Status Examination:    Level of consciousness:  within normal limits   Appearance:  fair grooming and fair hygiene  Behavior/Motor:  no abnormalities noted  Attitude toward examiner:  cooperative  Speech:  spontaneous   Mood: \" I am feeling better. \"  Affect: Appropriate and pleasant  Thought processes: Linear without flight of ideas loose associations  Thought content: Devoid of any auditory visualizations delusions during the perceptual arise. Denies SI/HI intent or plan  Cognition:  oriented to person, place, and time   Concentration intact  Insight Limited  Judgement Limited    ASSESSMENT:   Patient symptoms are:  [] Well controlled  [x] Improving  [] Worsening  [] No change      Diagnosis:   Principal Problem:    Major depressive disorder, recurrent episode with mixed features ([facility]  Active Problems:    Cluster B personality disorder ([facility]    Alcohol abuse  Resolved Problems:    * No resolved hospital problems. *      LABS:    No results for input(s): WBC, HGB, PLT in the last 72 hours. No results for input(s): NA, K, CL, CO2, BUN, CREATININE, GLUCOSE in the last 72 hours. No results for input(s): BILITOT, ALKPHOS, AST, ALT in the last 72 hours.   Lab Results   Component Value Date    LABAMPH NOT DETECTED 10/10/2021    BARBSCNU NOT DETECTED 10/10/2021    LABBENZ NOT DETECTED 10/10/2021    LABMETH NOT DETECTED 10/10/2021    OPIATESCREENURINE NOT DETECTED 10/10/2021    PHENCYCLIDINESCREENURINE NOT DETECTED 10/10/2021    ETOH 35 10/09/2021     No results found for: TSH, FREET4  No results found for: LITHIUM  No results found for: VALPROATE, CBMZ        Treatment Plan:  Reviewed current Medications with the patient. Risks, benefits, side effects, drug-to-drug interactions and alternatives to treatment were discussed. Collateral information:   CD evaluation  Encourage patient to attend group and other milieu activities.   Discharge planning discussed with the patient and treatment team.    Continue Celexa 10 mg daily for depression impulsive related to cluster B personality disorder  Increase Trileptal to 300 mg twice daily for mood stabilization    PSYCHOTHERAPY/COUNSELING:  [x] Therapeutic interview  [x] Supportive  [] CBT  [] Ongoing  [] Other    [x] Patient continues to need, on a daily basis, active treatment furnished directly by or requiring the supervision of inpatient psychiatric personnel      Anticipated Length of stay: 3 to 7 days based on stability            Electronically signed by YAMILA Hillman CNP on 29/58/9328 at 8:21 AM

## 2021-10-13 NOTE — BH NOTE
Out in johnson bright smiling denies any Si HI or johnson describes her suicide attempt more impulsive \"I got drunk and I wasn't thinking\" emotional support given

## 2021-10-13 NOTE — GROUP NOTE
Group Therapy Note    Date: 10/13/2021    Group Start Time: 1000  Group End Time: 1050  Group Topic: Psychoeducation    [facility]  [address] [place]                                                                        Group Therapy Note    Date: 10/13/2021    Type of Group: Psychoeducation  Wellness Binder Information  Notes: sharing and willing to participate when prompted in group. Status After Intervention:  Improved  Participation Level:  Active Listener and Interactive  Participation Quality: Appropriate, Attentive, Sharing, and Supportive  Speech:  normal   Thought Process/Content: Logical  Affective Functioning: Congruent  Mood: euthymic  Level of consciousness:  Alert, Oriented x4, and Attentive  Response to Learning: Able to verbalize/acknowledge new learning, Able to retain information, and Progressing to goal  Endings: None Reported  Modes of Intervention: Education, Support, Socialization, Exploration, and Problem-solving  Discipline Responsible: Psychoeducational Specialist  Signature:  Atif Denney

## 2021-10-14 VITALS
DIASTOLIC BLOOD PRESSURE: 52 MMHG | HEART RATE: 59 BPM | OXYGEN SATURATION: 98 % | BODY MASS INDEX: 40.18 KG/M2 | RESPIRATION RATE: 14 BRPM | WEIGHT: 250 LBS | SYSTOLIC BLOOD PRESSURE: 90 MMHG | HEIGHT: 66 IN | TEMPERATURE: 97.9 F

## 2021-10-14 PROCEDURE — 6370000000 HC RX 637 (ALT 250 FOR IP): Performed by: NURSE PRACTITIONER

## 2021-10-14 PROCEDURE — 99239 HOSP IP/OBS DSCHRG MGMT >30: CPT | Performed by: NURSE PRACTITIONER

## 2021-10-14 RX ORDER — CITALOPRAM 10 MG/1
10 TABLET ORAL DAILY
Qty: 30 TABLET | Refills: 0 | Status: SHIPPED | OUTPATIENT
Start: 2021-10-15 | End: 2021-11-14

## 2021-10-14 RX ORDER — OXCARBAZEPINE 300 MG/1
300 TABLET, FILM COATED ORAL 2 TIMES DAILY
Qty: 60 TABLET | Refills: 0 | Status: SHIPPED | OUTPATIENT
Start: 2021-10-14 | End: 2021-11-13

## 2021-10-14 RX ADMIN — OXCARBAZEPINE 300 MG: 300 TABLET, FILM COATED ORAL at 08:47

## 2021-10-14 RX ADMIN — CITALOPRAM HYDROBROMIDE 10 MG: 20 TABLET ORAL at 08:47

## 2021-10-14 RX ADMIN — LEVOTHYROXINE SODIUM 50 MCG: 0.05 TABLET ORAL at 06:32

## 2021-10-14 RX ADMIN — METFORMIN HYDROCHLORIDE 500 MG: 500 TABLET ORAL at 08:47

## 2021-10-14 ASSESSMENT — PAIN - FUNCTIONAL ASSESSMENT: PAIN_FUNCTIONAL_ASSESSMENT: 0-10

## 2021-10-14 ASSESSMENT — PAIN SCALES - GENERAL: PAINLEVEL_OUTOF10: 0

## 2021-10-14 NOTE — PROGRESS NOTES
Spiritual Support Group Note    Number of Participants in Group:     8                   Time:   2    Goal: Relief from isolation and loneliness             Monika Sharing             Self-understanding and gain insight              Acceptance and belonging            Recognize they are not alone                Socialization             Empowerment       Encouragement    Topic:  [x] Spiritual Wellness and Self Care                  [x] Hope                     [] Connecting with Divine/Others        [] Thankfulness and Gratitude               []  Meaningfulness and Purpose               [] Forgiveness               [] Peace               [] Connect to [facility]      [] Other    Participation Level:   [x] Active Listener   [] Minimal   [] Monopolizing   [] Interactive   [] No Participation   []  Other:     Attention:   [x] Alert   [] Distractible   [] Drowsy   [] Poor   [] Other:    Manner:   [x] Cooperative   [] Suspicious   [] Withdrawn   [] Guarded   [] Irritable   [] Inhospitable   [] Other:     Others Comments from Group:

## 2021-10-14 NOTE — DISCHARGE SUMMARY
DISCHARGE SUMMARY      Patient ID:  Sue Lunsford  [id number]  73 y.o.  2000    Admit date: 10/9/2021    Discharge date and time: 10/14/2021    Admitting Physician: Fabrizio Delcid MD     Discharge Physician: Dr Frankie Knox MD    Discharge Diagnoses:   Patient Active Problem List   Diagnosis    Major depressive disorder, recurrent episode with mixed features ([facility]    [facility] B personality disorder ([facility]    Alcohol abuse       Admission Condition: poor    Discharged Condition: stable    Admission Circumstance: Pt on 7SE unit for psychiatric evaluation s/p intentional drug OD. Pt was medically cleared and pink slipped at ED on 10/10/21 prior to arrival.      PAST MEDICAL/PSYCHIATRIC HISTORY:   Past Medical History:   Diagnosis Date    Depression     Polycystic ovarian disease     Thyroid disease        FAMILY/SOCIAL HISTORY:  History reviewed. No pertinent family history. Social History     Socioeconomic History    Marital status: Single     Spouse name: Not on file    Number of children: Not on file    Years of education: Not on file    Highest education level: Not on file   Occupational History    Not on file   Tobacco Use    Smoking status: Current Every Day Smoker     Types: E-Cigarettes    Smokeless tobacco: Never Used   Vaping Use    Vaping Use: Some days    Devices: Disposable    Passive vaping exposure Yes   Substance and Sexual Activity    Alcohol use: Never    Drug use: Not Currently    Sexual activity: Not on file   Other Topics Concern    Not on file   Social History Narrative    Not on file     Social Determinants of Health     Financial Resource Strain:     Difficulty of Paying Living Expenses:    Food Insecurity:     Worried About Running Out of Food in the Last Year:     Ran Out of Food in the Last Year:    Transportation Needs:     Lack of Transportation (Medical):      Lack of Transportation (Non-Medical):    Physical Activity:     Days of Exercise per Week:     Minutes of Exercise per Session:    Stress:     Feeling of Stress :    Social Connections:     Frequency of Communication with Friends and Family:     Frequency of Social Gatherings with Friends and Family:     Attends Anabaptist Services:     Active Member of Clubs or Organizations:     Attends Club or Organization Meetings:     Marital Status:    Intimate Partner Violence:     Fear of Current or Ex-Partner:     Emotionally Abused:     Physically Abused:     Sexually Abused:        MEDICATIONS:    Current Facility-Administered Medications:     OXcarbazepine (TRILEPTAL) tablet 300 mg, 300 mg, Oral, BID, YAMILA Grady - CNP, 387 mg at 10/14/21 0847    levothyroxine (SYNTHROID) tablet 50 mcg, 50 mcg, Oral, Daily, Kenedyroman Razo APRN - CNP, 50 mcg at 10/14/21 2768    metFORMIN (GLUCOPHAGE) tablet 500 mg, 500 mg, Oral, Daily, Cindi Danni APRN - CNP, 549 mg at 10/14/21 0847    citalopram (CELEXA) tablet 10 mg, 10 mg, Oral, Daily, Cindi YAMILA Razo - CNP, 10 mg at 10/14/21 0847    acetaminophen (TYLENOL) tablet 650 mg, 650 mg, Oral, Q6H PRN, Beth Crain MD, 650 mg at 10/11/21 1900    magnesium hydroxide (MILK OF MAGNESIA) 400 MG/5ML suspension 30 mL, 30 mL, Oral, Daily PRN, Beth Crain MD    nicotine (NICODERM CQ) 21 MG/24HR 1 patch, 1 patch, TransDERmal, Daily, Beth Crain MD    aluminum & magnesium hydroxide-simethicone (MAALOX) 200-200-20 MG/5ML suspension 30 mL, 30 mL, Oral, PRN, Beth Crain MD    hydrOXYzine (VISTARIL) capsule 50 mg, 50 mg, Oral, TID PRN, Beth Crain MD    haloperidol (HALDOL) tablet 5 mg, 5 mg, Oral, Q6H PRN **OR** haloperidol lactate (HALDOL) injection 5 mg, 5 mg, IntraMUSCular, Q6H PRN, Beth Crain MD    traZODone (DESYREL) tablet 50 mg, 50 mg, Oral, Nightly PRN, Beth Crain MD, 50 mg at 10/13/21 2108    Examination:  BP (!) 90/52   Pulse 59   Temp 97.9 °F (36.6 °C) (Oral)   Resp 14   Ht 5' 6\" (1.676 m)   Wt 250 lb (113.4 kg)   LMP 09/02/2021   SpO2 98%   BMI 40.35 kg/m²   Gait - steady    HOSPITAL COURSE[de-identified]  Patient was admitted to the unit on 10/9/2021 was closely monitored for suicidal ideations. She was evaluated and treated with Trileptal 300 mg twice daily for mood stabilization Celexa 10 mg daily for impulse related to cluster B personality disorder. Medical events were insignificant and patient continued to improve on the floor. She started coming out of her room she was attending groups to socializing with peers. She never made any suicidal statements or any suicidal gestures while in the unit. Social workers obtain confirmation patient's sister who was able to voice any concerns that she had. He reported no safety concerns no access to any weapons. Treatment team felt the patient obtain the maximum benefit for her hospitalization She was set up with an outpatient mental health agency for outpatient follow-up services . At the time of discharge patient  did not show impulsive behavior. She was up on the unit she was attending groups and socializing with peers. She vehemently denied any suicidal homicidal ideations intent or plan. She was eating well and sleeping well there are no neurovegetative signs or symptoms of depression she denied any auditory visualizations. There are no overt or covert signs psychosis. She was appreciative of the help that she received here. This patient no longer meets criteria for inpatient hospitalization. No AVH or paranoid thoughts  No hopeless or worthless feeling  No active SI/HI  Appetite:  [x] Normal  [] Increased  [] Decreased    Sleep:       [x] Normal  [] Fair       [] Poor            Energy:    [x] Normal  [] Increased  [] Decreased     SI [] Present  [x] Absent  HI  []Present  [x] Absent   Aggression:  [] yes  [x] no  Patient is [x] able  [] unable to CONTRACT FOR SAFETY   Medication side effects(SE):  [x] None(Psych.  Meds.) [] Other      Mental Status Examination on discharge:    Level of consciousness:  within normal limits   Appearance:  well-appearing  Behavior/Motor:  no abnormalities noted  Attitude toward examiner:  attentive and good eye contact  Speech:  spontaneous, normal rate and normal volume   Mood: \" My mood is good. \"  Affect: Appropriate pleasant  Thought processes: Linear without flight of ideas loose associations  Thought content: Devoid of any auditory visualizations delusions or other perceptual abnormalities denies SI/HI intent or plan  Cognition:  oriented to person, place, and time   Concentration intact  Memory intact  Insight good   Judgement fair   Fund of Knowledge adequate      ASSESSMENT:  Patient symptoms are:  [x] Well controlled  [x] Improving  [] Worsening  [] No change    Reason for more than one antipsychotic:  [x] N/A  [] 3 Failed Monotherapy attempts (Drugs tried:)  [] Crossover to a new antipsychotic  [] Taper to Monotherapy from Polypharmacy  [] Augmentation of clozapine therapy due to treatment resistance to single therapy    Diagnosis:  Principal Problem:    Major depressive disorder, recurrent episode with mixed features ([facility] [address] [facility]  Active Problems:    Cluster B personality disorder ([facility]    Alcohol abuse  Resolved Problems:    * No resolved hospital problems. *      LABS:    No results for input(s): WBC, HGB, PLT in the last 72 hours. No results for input(s): NA, K, CL, CO2, BUN, CREATININE, GLUCOSE in the last 72 hours. No results for input(s): BILITOT, ALKPHOS, AST, ALT in the last 72 hours.   Lab Results   Component Value Date    LABAMPH NOT DETECTED 10/10/2021    BARBSCNU NOT DETECTED 10/10/2021    LABBENZ NOT DETECTED 10/10/2021    LABMETH NOT DETECTED 10/10/2021    OPIATESCREENURINE NOT DETECTED 10/10/2021    PHENCYCLIDINESCREENURINE NOT DETECTED 10/10/2021    ETOH 35 10/09/2021     No results found for: TSH, FREET4  No results found for: LITHIUM  No results found for: VALPROATE, CBMZ    RISK ASSESSMENT AT DISCHARGE: Low risk for suicide and homicide. Treatment Plan:  Reviewed current Medications with the patient. Education provided on the complaince with treatment. Risks, benefits, side effects, drug-to-drug interactions and alternatives to treatment were discussed. Encourage patient to attend outpatient follow up appointment and therapy. Patient was advised to call the outpatient provider, visit the nearest ED or call 911 if symptoms are not manageable. Patient's family member was contacted prior to the discharge.          Medication List      START taking these medications    citalopram 10 MG tablet  Commonly known as: CELEXA  Take 1 tablet by mouth daily  Start taking on: October 15, 2021     OXcarbazepine 300 MG tablet  Commonly known as: TRILEPTAL  Take 1 tablet by mouth 2 times daily        CONTINUE taking these medications    metFORMIN 500 MG tablet  Commonly known as: GLUCOPHAGE     Synthroid 50 MCG tablet  Generic drug: levothyroxine        STOP taking these medications    sertraline 50 MG tablet  Commonly known as: ZOLOFT           Where to Get Your Medications      These medications were sent to [facility] [address]    Phone: [phone]   · citalopram 10 MG tablet  · OXcarbazepine 300 MG tablet       Patient is counseled if she continues to abuse drugs or alcohol she could act out impulsively causing serious harm to herself or others even though it may be unintentional.  She demonstrated understanding of this and has the capacity understand this    Patient is counseled her mental health treatment will be difficult to optimize with ongoing use of drugs or alcohol she demonstrated understanding of this and has the capacity to understand this     Patient is counseled she must remain compliant with all medications outpatient follow-up appointments    Patient is discharged home in stable

## 2021-10-14 NOTE — PROGRESS NOTES
Attended morning community meeting. Updated on staffing and daily expectations. Shared goal for the day as to finish my book.

## 2021-10-14 NOTE — GROUP NOTE
Group Therapy Note    Date: 10/14/2021    Group Start Time: 1010  Group End Time: 1050  Group Topic: Psychoeducation    [facility]  [address] [place], [place]                          Group Therapy Note    Date: 10/14/2021    Type of Group: Psychoeducation    Wellness Binder Information  Module Name:  id of daily/life stressors   Patient's Goal: will be able to id daily and life events one is currently experiencing. Notes: patient engaged in group willing to share when prompted. Status After Intervention:  Improved  Participation Level:  Active Listener and Interactive  Participation Quality: Appropriate, Attentive, Sharing, and Supportive  Speech:  normal   Thought Process/Content: Logical  Affective Functioning: Congruent  Mood: euphoric  Level of consciousness:  Alert, Oriented x4, and Attentive  Response to Learning: Able to verbalize/acknowledge new learning, Able to retain information, and Progressing to goal  Endings: None Reported  Modes of Intervention: Education, Support, Socialization, Exploration, and Problem-solving  Discipline Responsible: Psychoeducational Specialist  Signature:  Kayode Jade

## 2021-10-14 NOTE — SUICIDE SAFETY PLAN
SAFETY PLAN    A suicide Safety Plan is a document that supports someone when they are having thoughts of suicide. Warning Signs that indicate a suicidal crisis may be developing: What (situations, thoughts, feelings, body sensations, behaviors, etc.) do you experience that lets you know you are beginning to think about suicide? 1. Complete isolation  2. Not communicating well. 3. Not eating. Internal Coping Strategies:  What things can I do (relaxation techniques, hobbies, physical activities, etc.) to take my mind off my problems without contacting another person? 1. Watch a favorite movie. 2. Play video. 3. Take a nap    People and social settings that provide distraction: Who can I call or where can I go to distract me? 1. Name: Boyfriend  2. Name: Sister  3. Place: Bedroom         4. Place: Going for a drive. People whom I can ask for help: Who can I call when I need help - for example, friends, family, clergy, someone else? 1. Name: Boyfriend  2. Name: Sister  3. Name:  Ruth Bryant. Professionals or [address] [place] [address] I can contact during a crisis: Who can I call for help - for example, my doctor, my psychiatrist, my psychologist, a mental health provider, a suicide hotline? 1. Clinician Name: Caroline Lujan Psychologist    3. Suicide Prevention Lifeline: 4-097-733-TALK (0573)    4. [address] [place] [address] Emergency Services -  for example, [facility] suicide hotline, [facility] Hotline: As noted in discharge instructions. Emergency Services Address:       Emergency Services Phone:     Making the environment safe: How can I make my environment (house/apartment/living space) safer? For example, can I remove guns, medications, and other items? 1. Alcohol.    2.

## 2021-10-14 NOTE — PLAN OF CARE
Patient denies suicidal ideation, homicidal ideations and AVH. Patient denies anxiety and depression. Patient states she is \"fine, just reading. \"  Patient appears flat and anxious but does brighten with conversation. Presents calm and cooperative during assessment. Patient is out on the unit and is social with select peers. Medications taken without issue. No complaints or concerns verbalized at this time. No unit problems reported. Will continue to observe and support.     Problem: Altered Mood, Depressive Behavior:  Goal: Able to verbalize acceptance of life and situations over which he or she has no control  Description: Able to verbalize acceptance of life and situations over which he or she has no control  Outcome: Ongoing     Problem: Altered Mood, Depressive Behavior:  Goal: Able to verbalize and/or display a decrease in depressive symptoms  Description: Able to verbalize and/or display a decrease in depressive symptoms  10/13/2021 2109 by Bakari Blake RN  Outcome: Ongoing     Problem: Altered Mood, Depressive Behavior:  Goal: Ability to disclose and discuss suicidal ideas will improve  Description: Ability to disclose and discuss suicidal ideas will improve  10/13/2021 2109 by Bakari Blake RN  Outcome: Ongoing     Problem: Altered Mood, Depressive Behavior:  Goal: Able to verbalize support systems  Description: Able to verbalize support systems  Outcome: Ongoing

## 2021-10-14 NOTE — CARE COORDINATION
ANIA for [facility] signed by pt and faxed to [phone] as requested by provider

## 2021-10-14 NOTE — PROGRESS NOTES
CLINICAL PHARMACY NOTE: MEDS TO BEDS    Total # of Prescriptions Filled: 2   The following medications were delivered to the patient:  · Oxcarbazepine 300 mg  · Citalopram hydrobromide 10 mg    Additional Documentation:

## 2021-10-14 NOTE — PLAN OF CARE
Pt denies suicidal / homicidal ideations. Pt denies hallucinations. Pt is without major other distress. Pt reports goal, \"to finish book\" pr pt. Will follow and monitor.

## 2021-10-14 NOTE — GROUP NOTE
Group Therapy Note    Date: 10/14/2021    Group Start Time: 1100  Group End Time: 1244  Group Topic: Psychoeducation    [facility]    RICH Mclaughlin LSW        Group Therapy Note    Attendees: 13         Patient's Goal:  Pt will be able to verbalize and understand emotional warning signs    Notes: Pt participated in group and made connections    Status After Intervention:  Improved    Participation Level: Interactive    Participation Quality: Appropriate, Attentive, Sharing and Supportive      Speech:  normal      Thought Process/Content: Logical      Affective Functioning: Congruent      Mood: anxious      Level of consciousness:  Alert, Oriented x4 and Attentive      Response to Learning: Able to verbalize current knowledge/experience      Endings: None Reported    Modes of Intervention: Education, Support, Socialization, Exploration, Clarifying and Problem-solving      Discipline Responsible: /Counselor      Signature:  RICH Mclaughlin LSW

## 2021-10-14 NOTE — BH NOTE
[facility]  Discharge Note    Pt discharged with followings belongings:   Dentures: None  Vision - Corrective Lenses: None  Hearing Aid: None  Jewelry: None  Body Piercings Removed: N/A  Clothing: Shirt (book)  Were All Patient Medications Collected?: Not Applicable  Other Valuables: None   Valuables sent home with pt or returned to patient. Patient education on aftercare instructions: yes . Patient verbalize understanding of AVS:  Yes.     Status EXAM upon discharge:  Status and Exam  Normal: Yes  Facial Expression: Flat  Affect: Congruent  Level of Consciousness: Alert  Mood:Normal: No  Mood: Anxious  Motor Activity:Normal: Yes  Interview Behavior: Cooperative  Preception: [place] to Person, Cj Alstrom to Time, [place] to Place, [place] to Situation  Attention:Normal: Yes  Thought Processes: Circumstantial  Thought Content:Normal: No  Thought Content: Preoccupations, Compulsions  Hallucinations: None  Delusions: No  Memory:Normal: Yes  Insight and Judgment: No  Insight and Judgment: Poor Judgment, Poor Insight  Present Suicidal Ideation: No  Present Homicidal Ideation: No      Metabolic Screening:    Lab Results   Component Value Date    LABA1C 5.1 10/12/2021       Lab Results   Component Value Date    CHOL 163 10/12/2021     Lab Results   Component Value Date    TRIG 65 10/12/2021     Lab Results   Component Value Date    HDL 47 10/12/2021     No components found for: [facility]  Lab Results   Component Value Date    LABVLDL 13 10/12/2021       Rhonda Agarwal RN

## 2021-10-14 NOTE — CARE COORDINATION
In order to ensure appropriate transition and discharge planning is in place, the following documents have been transmitted to [facility], as the new outpatient provider:     The d/c diagnosis was transmitted to the next care provider   The reason for hospitalization was transmitted to the next care provider   The d/c medications (dosage and indication) were transmitted to the next care provider    The continuing care plan was transmitted to the next care provider

## 2021-10-15 NOTE — CARE COORDINATION
SW received call from pt requesting for SW to fax work excuse. Pt provided fax [phone]. SW faxed excuse.

## 2021-12-29 ENCOUNTER — NURSE ONLY (OUTPATIENT)
Dept: PRIMARY CARE CLINIC | Age: 21
End: 2021-12-29

## 2021-12-29 DIAGNOSIS — Z20.822 EXPOSURE TO COVID-19 VIRUS: Primary | ICD-10-CM

## 2021-12-29 LAB
Lab: NORMAL
PERFORMING INSTRUMENT: NORMAL
QC PASS/FAIL: NORMAL
SARS-COV-2, POC: NORMAL

## 2021-12-29 PROCEDURE — 87426 SARSCOV CORONAVIRUS AG IA: CPT | Performed by: FAMILY MEDICINE
